# Patient Record
Sex: FEMALE | HISPANIC OR LATINO | Employment: UNEMPLOYED | URBAN - METROPOLITAN AREA
[De-identification: names, ages, dates, MRNs, and addresses within clinical notes are randomized per-mention and may not be internally consistent; named-entity substitution may affect disease eponyms.]

---

## 2023-01-19 ENCOUNTER — OFFICE VISIT (OUTPATIENT)
Dept: OBGYN CLINIC | Facility: CLINIC | Age: 28
End: 2023-01-19

## 2023-01-19 VITALS
HEIGHT: 63 IN | BODY MASS INDEX: 24.8 KG/M2 | HEART RATE: 83 BPM | SYSTOLIC BLOOD PRESSURE: 111 MMHG | DIASTOLIC BLOOD PRESSURE: 78 MMHG | WEIGHT: 140 LBS

## 2023-01-19 DIAGNOSIS — R10.2 PELVIC PAIN: Primary | ICD-10-CM

## 2023-01-19 RX ORDER — DIPHENOXYLATE HYDROCHLORIDE AND ATROPINE SULFATE 2.5; .025 MG/1; MG/1
1 TABLET ORAL DAILY
COMMUNITY

## 2023-01-19 RX ORDER — NORETHINDRONE ACETATE AND ETHINYL ESTRADIOL 1MG-20(21)
1 KIT ORAL DAILY
Qty: 28 TABLET | Refills: 6 | Status: SHIPPED | OUTPATIENT
Start: 2023-01-19 | End: 2023-02-16

## 2023-01-19 NOTE — PROGRESS NOTES
Subjective    Patient is a 31 yo  who presents today to discuss pelvic pain  The patient and her partner are both deaf, but read lips in Uzbek  No certified Uzbek  was available  Anne Marie Birmingham assisted in [place] translation via lip reading  Patient reports that she has had this pain for the past 3-4 months  She had been taking OCPs which helped her pain in [facility], but has since stopped due to a change of rules in her country  She would like to restart the pills  She reports that she has very painful periods, which she has always had  She has no pain with defecation or dysuria  She has no other complaints  OB History        1    Para   0    Term                AB   1    Living   0       SAB   1    IAB        Ectopic        Multiple        Live Births   0                        Objective    Vitals:    23 1134   BP: 111/78   BP Location: Left arm   Patient Position: Sitting   Cuff Size: Adult   Pulse: 83   Weight: 63 5 kg (140 lb)   Height: 5' 3" (1 6 m)        Physical Exam  Constitutional:       Appearance: Normal appearance  Genitourinary:      Vulva normal       Genitourinary Comments: Cervix appears normal  Uterus is normal in contour  No adnexal masses palpated      No vaginal discharge  HENT:      Head: Normocephalic and atraumatic  Cardiovascular:      Rate and Rhythm: Normal rate  Pulmonary:      Effort: Pulmonary effort is normal  No respiratory distress  Abdominal:      Palpations: Abdomen is soft  Tenderness: There is no abdominal tenderness  Musculoskeletal:         General: Normal range of motion  Neurological:      Mental Status: She is alert  Skin:     General: Skin is warm and dry            Assessment    Patient Active Problem List   Diagnosis   • Pelvic pain        Plan  - Pelvic US ordered  - Junel prescribed   - Return for follow up in 2 weeks

## 2023-01-26 ENCOUNTER — HOSPITAL ENCOUNTER (OUTPATIENT)
Dept: ULTRASOUND IMAGING | Facility: HOSPITAL | Age: 28
Discharge: HOME | End: 2023-01-26

## 2023-01-26 DIAGNOSIS — R10.2 PELVIC PAIN: ICD-10-CM

## 2023-08-16 NOTE — PROGRESS NOTES
OB/GYN VISIT  [facility]  2023  12:59 PM      Assessment/Plan:    Whit Guerrero is a 29 y.o.  female with irregular menstrual cycles and bacterial vaginosis. Problem List Items Addressed This Visit        Genitourinary    Bacterial vaginosis    Relevant Medications    metroNIDAZOLE (FLAGYL) 500 mg tablet       Other    Irregular periods/menstrual cycles - Primary     Restart Loestrin  Reviewed normal side effects which are transient including breast pain, bloating, mood changes. Counseled that weight gain not associated. Normal pelvic ultrasound - reviewed results and imaging with patient and partner         Relevant Medications    norethindrone-ethinyl estradiol (Loestrin Fe ) 1-20 MG-MCG per tablet     RTC 3 months for OCP f/u    Subjective:     Whit Guerrero is a 29 y.o.  female who presents for evaluation of irregular periods. Patient presents with her . Both her and her  are deaf and speak Samoan sign language. Unfortunately, Samoan sign language not available on [employer]. Translation of visit completed with assistance of [employer] translate. Patient reports she has had irregular cycles for most of the year. She had two cycles in May (early and late), skipped cycles in /July and then had a normal period in August. She was previously prescribed Junel in January but discontinued due to concern regarding weight gain. She did report regular periods while taking Loestrin. Patient denies dysmenorrhea, dyspareunia, intermenstrual bleeding. She also reports increased vaginal odor and discharge. Had pelvic ultrasound completed in January. Patient does not desire pregnancy at this time, but considering starting trying next year. Objective:    Vitals: Blood pressure 91/88, pulse 90, resp. rate 18, height 5' 3.39" (1.61 m), weight 67 kg (147 lb 11.3 oz), last menstrual period 2023. Body mass index is 25.85 kg/m². History reviewed. No pertinent past medical history. History reviewed. No pertinent surgical history. Physical Exam  Vitals reviewed. Constitutional:       General: She is not in acute distress. Appearance: Normal appearance. She is well-developed and normal weight. She is not ill-appearing, toxic-appearing or diaphoretic. HENT:      Head: Normocephalic and atraumatic. Eyes:      General: No scleral icterus. Conjunctiva/sclera: Conjunctivae normal.   Cardiovascular:      Rate and Rhythm: Normal rate. Pulmonary:      Effort: Pulmonary effort is normal. No respiratory distress. Abdominal:      Palpations: Abdomen is soft. Tenderness: There is no abdominal tenderness. Genitourinary:     General: Normal vulva. Vagina: Vaginal discharge present. Comments: Grossly normal appearing cervix and vagina without lesions, masses, erythema or bleeding. Thin grey/watery discharge in posterior vaginal vault. Microscopy +clue cells, acidic pH. Negative for pseuodohyphae, trichomonas  Skin:     General: Skin is warm and dry. Neurological:      General: No focal deficit present. Mental Status: She is alert and oriented to person, place, and time. Psychiatric:         Mood and Affect: Mood normal.         Behavior: Behavior normal.           PELVIC ULTRASOUND, COMPLETE - January 2023     INDICATION:  The patient is 32years old. R10.2: Pelvic and perineal pain.     COMPARISON: None     TECHNIQUE:   Transabdominal pelvic ultrasound was performed in sagittal and transverse planes with a curvilinear transducer. Additional transvaginal imaging was performed to better evaluate the endometrium and ovaries. Imaging included volumetric   sweeps as well as traditional still imaging technique.     FINDINGS:     UTERUS:  The uterus is anteverted in position, measuring 9.0 x 3.6 x 5.2 cm. The uterus has a normal contour and echotexture.   Small nabothian cysts in the cervix.     ENDOMETRIUM:    The endometrial echo complex has an AP caliber of 10.0 mm. Its appearance is within normal limits for age and cycle and shows no filling defects. .     OVARIES/ADNEXA:  Right ovary:  3.8 x 2.5 x 3.3 cm. 16.4 mL. Left ovary:  4.1 x 2.0 x 2.3 cm. 9.4 mL. Ovarian Doppler flow is within normal limits.   No suspicious ovarian or adnexal abnormality.     OTHER:  No free fluid or loculated fluid collections.        IMPRESSION:     Normal.    Jovita Ignacio MD  8/17/2023  12:59 PM

## 2023-08-17 ENCOUNTER — OFFICE VISIT (OUTPATIENT)
Dept: OBGYN CLINIC | Facility: CLINIC | Age: 28
End: 2023-08-17

## 2023-08-17 VITALS
SYSTOLIC BLOOD PRESSURE: 91 MMHG | WEIGHT: 147.71 LBS | HEIGHT: 63 IN | DIASTOLIC BLOOD PRESSURE: 88 MMHG | BODY MASS INDEX: 26.17 KG/M2 | HEART RATE: 90 BPM | RESPIRATION RATE: 18 BRPM

## 2023-08-17 DIAGNOSIS — N76.0 BACTERIAL VAGINOSIS: ICD-10-CM

## 2023-08-17 DIAGNOSIS — N92.6 IRREGULAR PERIODS/MENSTRUAL CYCLES: Primary | ICD-10-CM

## 2023-08-17 DIAGNOSIS — B96.89 BACTERIAL VAGINOSIS: ICD-10-CM

## 2023-08-17 PROCEDURE — 99213 OFFICE O/P EST LOW 20 MIN: CPT | Performed by: OBSTETRICS & GYNECOLOGY

## 2023-08-17 RX ORDER — NORETHINDRONE ACETATE AND ETHINYL ESTRADIOL 1MG-20(21)
1 KIT ORAL DAILY
Qty: 28 TABLET | Refills: 6 | Status: SHIPPED | OUTPATIENT
Start: 2023-08-17 | End: 2024-08-17

## 2023-08-17 RX ORDER — METRONIDAZOLE 500 MG/1
500 TABLET ORAL EVERY 12 HOURS SCHEDULED
Qty: 14 TABLET | Refills: 0 | Status: SHIPPED | OUTPATIENT
Start: 2023-08-17 | End: 2023-08-24

## 2023-08-17 NOTE — ASSESSMENT & PLAN NOTE
Restart Loestrin  Reviewed normal side effects which are transient including breast pain, bloating, mood changes. Counseled that weight gain not associated.   Normal pelvic ultrasound - reviewed results and imaging with patient and partner

## 2023-08-28 ENCOUNTER — ANNUAL EXAM (OUTPATIENT)
Dept: OBGYN CLINIC | Facility: CLINIC | Age: 28
End: 2023-08-28

## 2023-08-28 VITALS
WEIGHT: 149.4 LBS | HEART RATE: 90 BPM | SYSTOLIC BLOOD PRESSURE: 108 MMHG | DIASTOLIC BLOOD PRESSURE: 76 MMHG | BODY MASS INDEX: 26.47 KG/M2 | HEIGHT: 63 IN

## 2023-08-28 DIAGNOSIS — Z30.41 ENCOUNTER FOR SURVEILLANCE OF CONTRACEPTIVE PILLS: ICD-10-CM

## 2023-08-28 DIAGNOSIS — Z01.419 ENCOUNTER FOR GYNECOLOGICAL EXAMINATION WITHOUT ABNORMAL FINDING: Primary | ICD-10-CM

## 2023-08-28 DIAGNOSIS — Z72.51 HIGH RISK SEXUAL BEHAVIOR, UNSPECIFIED TYPE: ICD-10-CM

## 2023-08-28 LAB — SL AMB POCT URINE HCG: NEGATIVE

## 2023-08-28 PROCEDURE — 87491 CHLMYD TRACH DNA AMP PROBE: CPT | Performed by: NURSE PRACTITIONER

## 2023-08-28 PROCEDURE — 87591 N.GONORRHOEAE DNA AMP PROB: CPT | Performed by: NURSE PRACTITIONER

## 2023-08-28 PROCEDURE — 81025 URINE PREGNANCY TEST: CPT | Performed by: NURSE PRACTITIONER

## 2023-08-28 PROCEDURE — 99395 PREV VISIT EST AGE 18-39: CPT | Performed by: NURSE PRACTITIONER

## 2023-08-28 PROCEDURE — G0145 SCR C/V CYTO,THINLAYER,RESCR: HCPCS | Performed by: NURSE PRACTITIONER

## 2023-08-28 NOTE — PROGRESS NOTES
ASSESSMENT & PLAN: Rafiq Arita is a 29 y.o. Fritzi Ahr with normal gynecologic exam.    1.  Routine well woman exam done today  2. Pap and HPV:  The patient's last pap was  8/2022, was done in [facility]  It was normal.    Pap was done today. Current ASCCP Guidelines reviewed. 3.  The following were reviewed in today's visit: breast self exam, use and side effects of OCPs, adequate intake of calcium and vitamin D, exercise and healthy diet. 4. Gardisil vaccine in women up to age 39 discussed and information was provided. 5. RTO in 3 mos for a pill check. Has a scheduled appt 11/16/2023. Depression Screening Follow-up Plan: Patient's depression screening was positive with a PHQ-2 score of 0. Their PHQ-9 score was 5 . Clinically patient does not have depression. No treatment is required. BMI Counseling: Body mass index is 26.47 kg/m². The BMI is above normal. Nutrition recommendations include 3-5 servings of fruits/vegetables daily, moderation in carbohydrate intake and reducing intake of cholesterol. Exercise recommendations include exercising 3-5 times per week. CC:  Annual Gynecologic Examination    HPI: Rafiq Arita is a 29 y.o. Fritzi Ahr who presents for annual gynecologic examination. She and her  are both deaf and speaks Italian sign language. This is not available on [employer], translation will need to be done through assistance of [employer]. She was seen 8/17/2023 for irregular menses and was also treated for BV. She was started on Loestrin to help regulate her periods. She has had irregular cycles for most of the year she had 2 cycles in May, skip cycles in June and July and then had a normal period in August.  She previously was prescribed Junel but discontinued due to weight gain. She did report regular periods with Loestrin in the past.    Health Maintenance:    She wears her seatbelt routinely.     She does perform regular monthly self breast exams.    She feels safe at home. History reviewed. No pertinent past medical history. History reviewed. No pertinent surgical history. Past OB/Gyn History:  OB History        1    Para   0    Term                AB   1    Living   0       SAB   1    IAB        Ectopic        Multiple        Live Births   0               Pt does not have menstrual issues. Regular when she takes OCP's  History of sexually transmitted infection: No.  History of abnormal pap smears: No .    Patient is currently sexually active. heterosexual.  The current method of family planning is OCP (estrogen/progesterone).     Family History   Problem Relation Age of Onset   • No Known Problems Mother    • No Known Problems Father    • No Known Problems Brother    • No Known Problems Brother    • No Known Problems Maternal Grandmother    • Heart disease Maternal Grandfather    • No Known Problems Paternal Grandmother    • Heart disease Paternal Grandfather    • Breast cancer Neg Hx    • Colon cancer Neg Hx    • Ovarian cancer Neg Hx        Social History:  Social History     Socioeconomic History   • Marital status: /Civil Union     Spouse name: Not on file   • Number of children: Not on file   • Years of education: Not on file   • Highest education level: Not on file   Occupational History   • Not on file   Tobacco Use   • Smoking status: Never   • Smokeless tobacco: Never   Vaping Use   • Vaping Use: Never used   Substance and Sexual Activity   • Alcohol use: Never   • Drug use: Never   • Sexual activity: Yes     Partners: Male     Birth control/protection: OCP   Other Topics Concern   • Not on file   Social History Narrative   • Not on file     Social Determinants of Health     Financial Resource Strain: Low Risk  (2023)    Overall Financial Resource Strain (CARDIA)    • Difficulty of Paying Living Expenses: Not hard at all   Food Insecurity: No Food Insecurity (2023)    Hunger Vital Sign    • Worried About Running Out of Food in the Last Year: Never true    • Ran Out of Food in the Last Year: Never true   Transportation Needs: No Transportation Needs (8/17/2023)    PRAPARE - Transportation    • Lack of Transportation (Medical): No    • Lack of Transportation (Non-Medical): No   Physical Activity: Not on file   Stress: Not on file   Social Connections: Not on file   Intimate Partner Violence: Not At Risk (8/28/2023)    Humiliation, Afraid, Rape, and Kick questionnaire    • Fear of Current or Ex-Partner: No    • Emotionally Abused: No    • Physically Abused: No    • Sexually Abused: No   Housing Stability: Low Risk  (8/17/2023)    Housing Stability Vital Sign    • Unable to Pay for Housing in the Last Year: No    • Number of Places Lived in the Last Year: 1    • Unstable Housing in the Last Year: No     Presently lives with . Patient is . Patient is currently unemployed     No Known Allergies      Current Outpatient Medications:   •  norethindrone-ethinyl estradiol (Loestrin Fe 1/20) 1-20 MG-MCG per tablet, Take 1 tablet by mouth daily, Disp: 28 tablet, Rfl: 6  •  multivitamin (THERAGRAN) TABS, Take 1 tablet by mouth daily (Patient not taking: Reported on 8/17/2023), Disp: , Rfl:       Review of Systems  Constitutional :no fever, feels well, no tiredness, no recent weight gain or loss  ENT: no ear ache, no loss of hearing, no nosebleeds or nasal discharge, no sore throat or hoarseness. Cardiovascular: no complaints of slow or fast heart beat, no chest pain, no palpitations, no leg claudication or lower extremity edema.   Respiratory: no complaints of shortness of shortness of breath, no MARIA  Breasts:no complaints of breast pain, breast lump, or nipple discharge  Gastrointestinal: no complaints of abdominal pain, constipation, nausea, vomiting, or diarrhea or bloody stools  Genitourinary : no complaints of dysuria, incontinence, pelvic pain, no dysmenorrhea, vaginal discharge or abnormal vaginal bleeding and as noted in HPI. Musculoskeletal: no complaints of arthralgia, no myalgia, no joint swelling or stiffness, no limb pain or swelling. Integumentary: no complaints of skin rash or lesion, itching or dry skin  Neurological: no complaints of headache, no confusion, no numbness or tingling, no dizziness or fainting    Objective      /76   Pulse 90   Ht 5' 2.99" (1.6 m)   Wt 67.8 kg (149 lb 6.4 oz)   LMP 08/08/2023 (Exact Date)   BMI 26.47 kg/m²   General:   appears stated age, cooperative, alert normal mood and affect   Neck: normal, supple,trachea midline, no masses   Heart: regular rate and rhythm, S1, S2 normal, no murmur, click, rub or gallop   Lungs: clear to auscultation bilaterally   Breasts: normal appearance, no masses or tenderness, Inspection negative, No nipple retraction or dimpling, No nipple discharge or bleeding, No axillary or supraclavicular adenopathy, Normal to palpation without dominant masses, Taught monthly breast self examination   Abdomen: soft, non-tender, without masses or organomegaly   Vulva: normal female genitalia, Bartholin's, Urethra, [employer] normal   Vagina: normal vagina, no discharge, exudate, lesion, or erythema   Urethra: normal   Cervix: Normal, no discharge. PAP done. GCC done. Nontender. Uterus: normal size, contour, position, consistency, mobility, non-tender and anteverted   Adnexa: normal adnexa and no mass, fullness, tenderness   Lymphatic palpation of lymph nodes in neck, axilla, groin and/or other locations: no lymphadenopathy or masses noted   Skin normal skin turgor and no rashes.    Psychiatric orientation to person, place, and time: normal. mood and affect: normal

## 2023-08-29 LAB
C TRACH DNA SPEC QL NAA+PROBE: NEGATIVE
N GONORRHOEA DNA SPEC QL NAA+PROBE: NEGATIVE

## 2023-08-30 ENCOUNTER — TELEPHONE (OUTPATIENT)
Dept: OBGYN CLINIC | Facility: CLINIC | Age: 28
End: 2023-08-30

## 2023-08-30 NOTE — TELEPHONE ENCOUNTER
----- Message from Manolo Diez, [place] sent at 8/29/2023 12:24 PM EDT -----  Please inform pt that her culture for chlamydia and gonorrhea were negative. Thank You!

## 2023-09-01 LAB
LAB AP GYN PRIMARY INTERPRETATION: NORMAL
Lab: NORMAL

## 2023-09-05 ENCOUNTER — TELEPHONE (OUTPATIENT)
Dept: OBGYN CLINIC | Facility: CLINIC | Age: 28
End: 2023-09-05

## 2023-09-05 NOTE — TELEPHONE ENCOUNTER
----- Message from Vineet Mcdonnell [place] sent at 9/1/2023 10:56 AM EDT -----  Please inform pt that her pap was negative. Thank You.

## 2023-09-05 NOTE — TELEPHONE ENCOUNTER
Message left for patient that results are available in [facility]. Encouraged to call office with any questions/concerns.

## 2023-10-05 DIAGNOSIS — N92.6 IRREGULAR PERIODS/MENSTRUAL CYCLES: ICD-10-CM

## 2023-10-11 RX ORDER — NORETHINDRONE ACETATE AND ETHINYL ESTRADIOL AND FERROUS FUMARATE 1MG-20(21)
1 KIT ORAL DAILY
Qty: 84 TABLET | Refills: 3 | Status: SHIPPED | OUTPATIENT
Start: 2023-10-11

## 2023-10-27 PROBLEM — Z01.419 ENCOUNTER FOR GYNECOLOGICAL EXAMINATION WITHOUT ABNORMAL FINDING: Status: RESOLVED | Noted: 2023-08-28 | Resolved: 2023-10-27

## 2023-11-16 ENCOUNTER — OFFICE VISIT (OUTPATIENT)
Dept: OBGYN CLINIC | Facility: CLINIC | Age: 28
End: 2023-11-16

## 2023-11-16 VITALS
DIASTOLIC BLOOD PRESSURE: 83 MMHG | SYSTOLIC BLOOD PRESSURE: 119 MMHG | WEIGHT: 150.6 LBS | BODY MASS INDEX: 26.68 KG/M2 | HEIGHT: 63 IN | HEART RATE: 97 BPM

## 2023-11-16 DIAGNOSIS — Z31.9 PATIENT DESIRES PREGNANCY: Primary | ICD-10-CM

## 2023-11-16 PROBLEM — N76.0 BACTERIAL VAGINOSIS: Status: RESOLVED | Noted: 2023-08-17 | Resolved: 2023-11-16

## 2023-11-16 PROBLEM — Z30.41 ENCOUNTER FOR SURVEILLANCE OF CONTRACEPTIVE PILLS: Status: RESOLVED | Noted: 2023-08-28 | Resolved: 2023-11-16

## 2023-11-16 PROBLEM — B96.89 BACTERIAL VAGINOSIS: Status: RESOLVED | Noted: 2023-08-17 | Resolved: 2023-11-16

## 2023-11-16 RX ORDER — PNV NO.95/FERROUS FUM/FOLIC AC 28MG-0.8MG
1 TABLET ORAL DAILY
Qty: 90 TABLET | Refills: 3 | Status: SHIPPED | OUTPATIENT
Start: 2023-11-16 | End: 2024-11-14

## 2023-11-16 NOTE — PROGRESS NOTES
Assessment/Plan:    No problem-specific Assessment & Plan notes found for this encounter. Annual due 8/28/2024     Diagnoses and all orders for this visit:    Patient desires pregnancy  -     Prenatal Vit-Fe Fumarate-FA (Prenatal Vitamin) 27-0.8 MG TABS; Take 1 tablet by mouth in the morning  Reviewed to RTO in 6 months if pregnancy not achieved. Reviewed timed intercourse. Annual due 8/2024        Subjective:      Patient ID: Jah Ortiz is a 29 y.o. female. HPI 28 yo here for a pill check. She is on Loestrin Fe 1/20 but she has just stopped use. Pt is deaf,  sign  [id number] used. Pt desires to have a pregnancy but not sure when she wants to start. Will order prenatal vitamins. While on the pill her menses were regular, monthly and last about 6 days. Previously they were skipping sometimes for a  month. Reviewed they may return to their irregular schedule. She reports previously they were regular. She denies pain with her periods. She had a normal pelvic US 1/2023. She denies any medical problems, reports her brother and her were both deaf at birth due to an infection that her mother had in pregnancy. She does not use alcohol, smoke or drug use. Her BP is normal  today. The following portions of the patient's history were reviewed and updated as appropriate: allergies, current medications, past family history, past medical history, past social history, past surgical history, and problem list.    Review of Systems   Constitutional:  Negative for chills and fever. Eyes:  Negative for photophobia and visual disturbance. Respiratory: Negative. Cardiovascular: Negative. Gastrointestinal: Negative. Genitourinary:  Negative for menstrual problem. Neurological:  Negative for headaches.          Objective:      /83   Pulse 97   Ht 5' 3" (1.6 m)   Wt 68.3 kg (150 lb 9.6 oz)   LMP 11/06/2023   BMI 26.68 kg/m²          Physical Exam  Constitutional: Appearance: Normal appearance. Cardiovascular:      Rate and Rhythm: Normal rate.    Pulmonary:      Effort: Pulmonary effort is normal.

## 2024-07-10 ENCOUNTER — TELEPHONE (OUTPATIENT)
Age: 29
End: 2024-07-10

## 2024-07-10 NOTE — TELEPHONE ENCOUNTER
Pt called in regarding an upcoming appt with our office on 7/17. Pt states she is deaf and will need a  for this visit.    Please advise.

## 2024-07-17 ENCOUNTER — OFFICE VISIT (OUTPATIENT)
Dept: FAMILY MEDICINE CLINIC | Facility: CLINIC | Age: 29
End: 2024-07-17
Payer: COMMERCIAL

## 2024-07-17 VITALS
WEIGHT: 161.8 LBS | DIASTOLIC BLOOD PRESSURE: 64 MMHG | TEMPERATURE: 97.4 F | RESPIRATION RATE: 14 BRPM | SYSTOLIC BLOOD PRESSURE: 116 MMHG | OXYGEN SATURATION: 98 % | BODY MASS INDEX: 27.62 KG/M2 | HEART RATE: 88 BPM | HEIGHT: 64 IN

## 2024-07-17 DIAGNOSIS — Z11.4 SCREENING FOR HIV (HUMAN IMMUNODEFICIENCY VIRUS): ICD-10-CM

## 2024-07-17 DIAGNOSIS — Z11.59 NEED FOR HEPATITIS C SCREENING TEST: ICD-10-CM

## 2024-07-17 DIAGNOSIS — M54.2 NECK PAIN: ICD-10-CM

## 2024-07-17 DIAGNOSIS — M62.838 TRAPEZIUS MUSCLE SPASM: Primary | ICD-10-CM

## 2024-07-17 DIAGNOSIS — Z11.3 SCREENING EXAMINATION FOR STD (SEXUALLY TRANSMITTED DISEASE): ICD-10-CM

## 2024-07-17 DIAGNOSIS — G89.29 CHRONIC BILATERAL THORACIC BACK PAIN: ICD-10-CM

## 2024-07-17 DIAGNOSIS — M54.6 CHRONIC BILATERAL THORACIC BACK PAIN: ICD-10-CM

## 2024-07-17 DIAGNOSIS — L84 CORN OF FOOT: ICD-10-CM

## 2024-07-17 DIAGNOSIS — Z00.01 ENCOUNTER FOR GENERAL ADULT MEDICAL EXAMINATION WITH ABNORMAL FINDINGS: ICD-10-CM

## 2024-07-17 PROBLEM — Z72.51 HIGH RISK SEXUAL BEHAVIOR: Status: RESOLVED | Noted: 2023-08-28 | Resolved: 2024-07-17

## 2024-07-17 PROBLEM — Z31.9 PATIENT DESIRES PREGNANCY: Status: RESOLVED | Noted: 2023-11-16 | Resolved: 2024-07-17

## 2024-07-17 PROCEDURE — 99204 OFFICE O/P NEW MOD 45 MIN: CPT | Performed by: FAMILY MEDICINE

## 2024-07-17 PROCEDURE — 99395 PREV VISIT EST AGE 18-39: CPT | Performed by: FAMILY MEDICINE

## 2024-07-17 RX ORDER — MELOXICAM 15 MG/1
15 TABLET ORAL DAILY
Qty: 30 TABLET | Refills: 2 | Status: SHIPPED | OUTPATIENT
Start: 2024-07-17

## 2024-07-17 RX ORDER — TIZANIDINE 2 MG/1
2 TABLET ORAL
Qty: 30 TABLET | Refills: 0 | Status: SHIPPED | OUTPATIENT
Start: 2024-07-17

## 2024-07-17 NOTE — PROGRESS NOTES
Adult Annual Physical  Name: Bee Marquez      : 1995      MRN: [id number]  Encounter Provider: Melissa Coleman MD  Encounter Date: 2024   Encounter department: [facility] MEDICINE    Assessment & Plan   1. Trapezius muscle spasm  -     XR spine cervical complete 4 or 5 vw non injury; Future; Expected date: 2024  -     tiZANidine (ZANAFLEX) 2 mg tablet; Take 1 tablet (2 mg total) by mouth daily at bedtime as needed for muscle spasms  -     XR spine thoracic 3 vw; Future; Expected date: 2024  -     Vitamin D 25 hydroxy; Future  2. [place] of foot  -     Ambulatory Referral to Podiatry; Future  3. Neck pain  -     meloxicam (Mobic) 15 mg tablet; Take 1 tablet (15 mg total) by mouth daily  -     Vitamin D 25 hydroxy; Future  4. Chronic bilateral thoracic back pain  -     HLA-B27 antigen; Future  -     TONA Screen w/ Reflex to Titer/Pattern; Future  -     Sedimentation rate, automated; Future  -     meloxicam (Mobic) 15 mg tablet; Take 1 tablet (15 mg total) by mouth daily  5. Encounter for general adult medical examination with abnormal findings  -     CBC and differential; Future  -     Comprehensive metabolic panel; Future  -     Lipid panel; Future  -     TSH, 3rd generation with Free T4 reflex; Future  -     Hemoglobin A1C; Future  6. Need for hepatitis C screening test  -     Hepatitis C Antibody; Future  7. Screening for HIV (human immunodeficiency virus)  -     HIV 1/2 AG/AB w Reflex [facility] for 2 yr old and above; Future  8. Screening examination for STD (sexually transmitted disease)  -     Hepatitis C Antibody; Future  -     HIV 1/2 AG/AB w Reflex [facility] for 2 yr old and above; Future  Recommend x-ray of the cervical spine and the thoracic spine.  Recommend meloxicam 15 mg daily in the morning after food, tizanidine 2 mg at bedtime.  Check labs as above.      Advised to use over-the-counter corn tape along with afshin stone for corn relief and follow-up with  podiatry if not improved.      Immunizations and preventive care screenings were discussed with patient today. Appropriate education was printed on patient's after visit summary.    Counseling:  Alcohol/drug use: discussed moderation in alcohol intake, the recommendations for healthy alcohol use, and avoidance of illicit drug use.  Dental Health: discussed importance of regular tooth brushing, flossing, and dental visits.  Injury prevention: discussed safety/seat belts, safety helmets, smoke detectors, carbon dioxide detectors, and smoking near bedding or upholstery.  Sexual health: discussed sexually transmitted diseases, partner selection, use of condoms, avoidance of unintended pregnancy, and contraceptive alternatives.  Exercise: the importance of regular exercise/physical activity was discussed. Recommend exercise 3-5 times per week for at least 30 minutes.          History of Present Illness     Adult Annual Physical:  Patient presents for annual physical. 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  She moved here from [place] about a year ago.    She does report that the pain is mainly in the morning or in the evening.  Live  present in the room during the visit.  Patient is also due for annual physical.    Patient was scheduled for annual physical however patient did have EM problems that were addressed and is aware that they will get 2 separate bills, and is agreeable to conducting the visit.  .     Diet and Physical Activity:  - Diet/Nutrition: well balanced diet and consuming 3-5 servings of fruits/vegetables daily.    Depression Screening:  - PHQ-2 Score: 0    General Health:  - Sleep: sleeps well.  - Hearing:. 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    /GYN Health:  - Follows with GYN: yes.   - Menopause: premenopausal.     Review of Systems   Constitutional:   "Negative for fatigue and fever.   HENT:  Negative for congestion, facial swelling, mouth sores, rhinorrhea, sore throat and trouble swallowing.    Eyes:  Negative for pain and redness.   Respiratory:  Negative for cough, shortness of breath and wheezing.    Cardiovascular:  Negative for chest pain, palpitations and leg swelling.   Gastrointestinal:  Negative for abdominal pain, blood in stool, constipation, diarrhea and nausea.   Genitourinary:  Negative for dysuria, hematuria and urgency.   Musculoskeletal:  Positive for myalgias and neck pain. Negative for arthralgias and back pain.   Skin:  Negative for rash and wound.   Neurological:  Negative for seizures, syncope and headaches.   Hematological:  Negative for adenopathy.   Psychiatric/Behavioral:  Negative for agitation and behavioral problems.      Medical History Reviewed by provider this encounter:  Tobacco  Allergies  Meds  Problems  Med Hx  Surg Hx  Fam Hx       Current Outpatient Medications on File Prior to Visit   Medication Sig Dispense Refill    Prenatal Vit-Fe Fumarate-FA (Prenatal Vitamin) 27-0.8 MG TABS Take 1 tablet by mouth in the morning 90 tablet 3    multivitamin (THERAGRAN) TABS Take 1 tablet by mouth daily (Patient not taking: Reported on 8/17/2023)       No current facility-administered medications on file prior to visit.      Social History     Tobacco Use    Smoking status: Never    Smokeless tobacco: Never   Vaping Use    Vaping status: Never Used   Substance and Sexual Activity    Alcohol use: Never    Drug use: Never    Sexual activity: Yes     Partners: Male       Objective     /64 (BP Location: Left arm, Patient Position: Sitting, Cuff Size: Adult)   Pulse 88   Temp (!) 97.4 °F (36.3 °C) (Tympanic)   Resp 14   Ht 5' 4\" (1.626 m)   Wt 73.4 kg (161 lb 12.8 oz)   SpO2 98%   BMI 27.77 kg/m²     Physical Exam  Vitals and nursing note reviewed.   Constitutional:       Appearance: She is well-developed.   HENT:      " Head: Normocephalic and atraumatic.      Right Ear: Tympanic membrane, ear canal and external ear normal.      Left Ear: Tympanic membrane, ear canal and external ear normal.      Nose: Nose normal.      Mouth/Throat:      Pharynx: No oropharyngeal exudate.   Eyes:      General: No scleral icterus.        Right eye: No discharge.         Left eye: No discharge.      Conjunctiva/sclera: Conjunctivae normal.      Pupils: Pupils are equal, round, and reactive to light.   Neck:      Thyroid: No thyromegaly.   Cardiovascular:      Rate and Rhythm: Normal rate and regular rhythm.      Heart sounds: No murmur heard.     No gallop.   Pulmonary:      Effort: Pulmonary effort is normal. No respiratory distress.      Breath sounds: Normal breath sounds. No wheezing or rales.   Abdominal:      Palpations: Abdomen is soft.      Tenderness: There is no abdominal tenderness.   Musculoskeletal:         General: Tenderness (bilateral trapezius) present. No deformity.      Cervical back: Normal range of motion.      Right lower leg: No edema.      Left lower leg: No edema.   Lymphadenopathy:      Cervical: No cervical adenopathy.   Skin:     General: Skin is warm.      Capillary Refill: Capillary refill takes less than 2 seconds.      Findings: Lesion (corn right foot) present. No erythema or rash.   Neurological:      Mental Status: She is alert and oriented to person, place, and time.      Deep Tendon Reflexes: Reflexes normal.   Psychiatric:         Behavior: Behavior normal.         Thought Content: Thought content normal.         Judgment: Judgment normal.

## 2024-07-18 ENCOUNTER — APPOINTMENT (OUTPATIENT)
Dept: LAB | Facility: HOSPITAL | Age: 29
End: 2024-07-18
Payer: COMMERCIAL

## 2024-07-18 DIAGNOSIS — Z00.01 ENCOUNTER FOR GENERAL ADULT MEDICAL EXAMINATION WITH ABNORMAL FINDINGS: ICD-10-CM

## 2024-07-18 DIAGNOSIS — M54.6 CHRONIC BILATERAL THORACIC BACK PAIN: ICD-10-CM

## 2024-07-18 DIAGNOSIS — Z11.4 SCREENING FOR HIV (HUMAN IMMUNODEFICIENCY VIRUS): ICD-10-CM

## 2024-07-18 DIAGNOSIS — M62.838 TRAPEZIUS MUSCLE SPASM: ICD-10-CM

## 2024-07-18 DIAGNOSIS — M54.2 NECK PAIN: ICD-10-CM

## 2024-07-18 DIAGNOSIS — G89.29 CHRONIC BILATERAL THORACIC BACK PAIN: ICD-10-CM

## 2024-07-18 DIAGNOSIS — Z11.3 SCREENING EXAMINATION FOR STD (SEXUALLY TRANSMITTED DISEASE): ICD-10-CM

## 2024-07-18 DIAGNOSIS — Z11.59 NEED FOR HEPATITIS C SCREENING TEST: ICD-10-CM

## 2024-07-18 LAB
25(OH)D3 SERPL-MCNC: 24.8 NG/ML (ref 30–100)
ALBUMIN SERPL BCG-MCNC: 4.1 G/DL (ref 3.5–5)
ALP SERPL-CCNC: 40 U/L (ref 34–104)
ALT SERPL W P-5'-P-CCNC: 30 U/L (ref 7–52)
ANA SER QL IA: NEGATIVE
ANION GAP SERPL CALCULATED.3IONS-SCNC: 7 MMOL/L (ref 4–13)
AST SERPL W P-5'-P-CCNC: 22 U/L (ref 13–39)
BASOPHILS # BLD AUTO: 0.05 THOUSANDS/ÂΜL (ref 0–0.1)
BASOPHILS NFR BLD AUTO: 1 % (ref 0–1)
BILIRUB SERPL-MCNC: 0.5 MG/DL (ref 0.2–1)
BUN SERPL-MCNC: 13 MG/DL (ref 5–25)
CALCIUM SERPL-MCNC: 8.9 MG/DL (ref 8.4–10.2)
CHLORIDE SERPL-SCNC: 106 MMOL/L (ref 96–108)
CHOLEST SERPL-MCNC: 158 MG/DL
CO2 SERPL-SCNC: 25 MMOL/L (ref 21–32)
CREAT SERPL-MCNC: 0.58 MG/DL (ref 0.6–1.3)
EOSINOPHIL # BLD AUTO: 0.18 THOUSAND/ÂΜL (ref 0–0.61)
EOSINOPHIL NFR BLD AUTO: 3 % (ref 0–6)
ERYTHROCYTE [DISTWIDTH] IN BLOOD BY AUTOMATED COUNT: 12.7 % (ref 11.6–15.1)
ERYTHROCYTE [SEDIMENTATION RATE] IN BLOOD: 8 MM/HOUR (ref 0–19)
EST. AVERAGE GLUCOSE BLD GHB EST-MCNC: 120 MG/DL
GFR SERPL CREATININE-BSD FRML MDRD: 124 ML/MIN/1.73SQ M
GLUCOSE P FAST SERPL-MCNC: 94 MG/DL (ref 65–99)
HBA1C MFR BLD: 5.8 %
HCT VFR BLD AUTO: 39.1 % (ref 34.8–46.1)
HCV AB SER QL: NORMAL
HDLC SERPL-MCNC: 55 MG/DL
HGB BLD-MCNC: 12.8 G/DL (ref 11.5–15.4)
HIV 1+2 AB+HIV1 P24 AG SERPL QL IA: NORMAL
HIV 2 AB SERPL QL IA: NORMAL
HIV1 AB SERPL QL IA: NORMAL
HIV1 P24 AG SERPL QL IA: NORMAL
IMM GRANULOCYTES # BLD AUTO: 0.02 THOUSAND/UL (ref 0–0.2)
IMM GRANULOCYTES NFR BLD AUTO: 0 % (ref 0–2)
LDLC SERPL CALC-MCNC: 91 MG/DL (ref 0–100)
LYMPHOCYTES # BLD AUTO: 2.26 THOUSANDS/ÂΜL (ref 0.6–4.47)
LYMPHOCYTES NFR BLD AUTO: 38 % (ref 14–44)
MCH RBC QN AUTO: 29.1 PG (ref 26.8–34.3)
MCHC RBC AUTO-ENTMCNC: 32.7 G/DL (ref 31.4–37.4)
MCV RBC AUTO: 89 FL (ref 82–98)
MONOCYTES # BLD AUTO: 0.35 THOUSAND/ÂΜL (ref 0.17–1.22)
MONOCYTES NFR BLD AUTO: 6 % (ref 4–12)
NEUTROPHILS # BLD AUTO: 3.03 THOUSANDS/ÂΜL (ref 1.85–7.62)
NEUTS SEG NFR BLD AUTO: 52 % (ref 43–75)
NONHDLC SERPL-MCNC: 103 MG/DL
NRBC BLD AUTO-RTO: 0 /100 WBCS
PLATELET # BLD AUTO: 351 THOUSANDS/UL (ref 149–390)
PMV BLD AUTO: 9.5 FL (ref 8.9–12.7)
POTASSIUM SERPL-SCNC: 4 MMOL/L (ref 3.5–5.3)
PROT SERPL-MCNC: 6.9 G/DL (ref 6.4–8.4)
RBC # BLD AUTO: 4.4 MILLION/UL (ref 3.81–5.12)
SODIUM SERPL-SCNC: 138 MMOL/L (ref 135–147)
TRIGL SERPL-MCNC: 62 MG/DL
TSH SERPL DL<=0.05 MIU/L-ACNC: 1.85 UIU/ML (ref 0.45–4.5)
WBC # BLD AUTO: 5.89 THOUSAND/UL (ref 4.31–10.16)

## 2024-07-18 PROCEDURE — 80053 COMPREHEN METABOLIC PANEL: CPT

## 2024-07-18 PROCEDURE — 87389 HIV-1 AG W/HIV-1&-2 AB AG IA: CPT

## 2024-07-18 PROCEDURE — 86803 HEPATITIS C AB TEST: CPT

## 2024-07-18 PROCEDURE — 83036 HEMOGLOBIN GLYCOSYLATED A1C: CPT

## 2024-07-18 PROCEDURE — 85025 COMPLETE CBC W/AUTO DIFF WBC: CPT

## 2024-07-18 PROCEDURE — 84443 ASSAY THYROID STIM HORMONE: CPT

## 2024-07-18 PROCEDURE — 81374 HLA I TYPING 1 ANTIGEN LR: CPT

## 2024-07-18 PROCEDURE — 80061 LIPID PANEL: CPT

## 2024-07-18 PROCEDURE — 82306 VITAMIN D 25 HYDROXY: CPT

## 2024-07-18 PROCEDURE — 85652 RBC SED RATE AUTOMATED: CPT

## 2024-07-18 PROCEDURE — 86038 ANTINUCLEAR ANTIBODIES: CPT

## 2024-07-18 PROCEDURE — 36415 COLL VENOUS BLD VENIPUNCTURE: CPT

## 2024-07-22 DIAGNOSIS — E55.9 VITAMIN D DEFICIENCY: Primary | ICD-10-CM

## 2024-07-22 RX ORDER — ERGOCALCIFEROL 1.25 MG/1
50000 CAPSULE ORAL WEEKLY
Qty: 12 CAPSULE | Refills: 1 | Status: SHIPPED | OUTPATIENT
Start: 2024-07-22

## 2024-07-26 LAB — HLA-B27 QL NAA+PROBE: NEGATIVE

## 2024-08-09 DIAGNOSIS — M62.838 TRAPEZIUS MUSCLE SPASM: ICD-10-CM

## 2024-08-12 RX ORDER — TIZANIDINE 2 MG/1
2 TABLET ORAL
Qty: 30 TABLET | Refills: 0 | Status: SHIPPED | OUTPATIENT
Start: 2024-08-12

## 2024-08-28 DIAGNOSIS — M62.838 TRAPEZIUS MUSCLE SPASM: ICD-10-CM

## 2024-08-29 RX ORDER — TIZANIDINE 2 MG/1
2 TABLET ORAL
Qty: 30 TABLET | Refills: 1 | Status: SHIPPED | OUTPATIENT
Start: 2024-08-29

## 2024-09-06 ENCOUNTER — OFFICE VISIT (OUTPATIENT)
Dept: PODIATRY | Facility: CLINIC | Age: 29
End: 2024-09-06
Payer: COMMERCIAL

## 2024-09-06 VITALS
OXYGEN SATURATION: 99 % | SYSTOLIC BLOOD PRESSURE: 113 MMHG | HEART RATE: 67 BPM | WEIGHT: 160 LBS | DIASTOLIC BLOOD PRESSURE: 80 MMHG | HEIGHT: 64 IN | BODY MASS INDEX: 27.31 KG/M2

## 2024-09-06 DIAGNOSIS — B07.0 VERRUCA PLANTARIS: ICD-10-CM

## 2024-09-06 PROCEDURE — 99202 OFFICE O/P NEW SF 15 MIN: CPT | Performed by: PODIATRIST

## 2024-09-06 PROCEDURE — 17110 DESTRUCTION B9 LES UP TO 14: CPT | Performed by: PODIATRIST

## 2024-09-06 NOTE — PROGRESS NOTES
Assessment/Plan:     The patient's clinical examination today significant for a punctate callosity to the distal/plantar aspect of the left great toe with tenderness palpation lateral squeeze.  There is interruption of skin lines with small punctate hematomas consistent with verruca formation.  Pedal pulses are palpable.    The verrucous lesion was debrided of callus tissue utilizing sterile #15 blade without complication.  The lesion was then chemically ablated with application of Cantharone.  And dry sterile dressing was applied and is to maintain for the next 48 hours.    Recommend follow-up in 3 to 4 weeks.     Diagnoses and all orders for this visit:    Verruca plantaris  -     Ambulatory Referral to Podiatry          Subjective:     Patient ID: Bee Marquez is a 29 y.o. female.    The patient presents today for her initial consultation with [facility] podiatry with a chief complaint of a painful callus lesion to the plantar aspect of her left great toe that has been present now for couple of months.  She does note that the lesion is tender.  She has not tried any treatments for this at home.  The patient is deaf and a  was utilized via iPad for the duration of this encounter.      PAST MEDICAL HISTORY:  Past Medical History:   Diagnosis Date    Patient desires pregnancy 11/16/2023       PAST SURGICAL HISTORY:  History reviewed. No pertinent surgical history.     ALLERGIES:  Patient has no known allergies.    MEDICATIONS:  Current Outpatient Medications   Medication Sig Dispense Refill    ergocalciferol (VITAMIN D2) 50,000 units Take 1 capsule (50,000 Units total) by mouth once a week 12 capsule 1    Prenatal Vit-Fe Fumarate-FA (Prenatal Vitamin) 27-0.8 MG TABS Take 1 tablet by mouth in the morning 90 tablet 3    meloxicam (Mobic) 15 mg tablet Take 1 tablet (15 mg total) by mouth daily (Patient not taking: Reported on 9/6/2024) 30 tablet 2    multivitamin (THERAGRAN) TABS  Take 1 tablet by mouth daily (Patient not taking: Reported on 8/17/2023)      tiZANidine (ZANAFLEX) 2 mg tablet TAKE 1 TABLET (2 MG TOTAL) BY MOUTH DAILY AT BEDTIME AS NEEDED FOR MUSCLE SPASMS (Patient not taking: Reported on 9/6/2024) 30 tablet 1     No current facility-administered medications for this visit.       SOCIAL HISTORY:  Social History     Socioeconomic History    Marital status: /Civil Union     Spouse name: None    Number of children: None    Years of education: None    Highest education level: None   Occupational History    None   Tobacco Use    Smoking status: Never    Smokeless tobacco: Never   Vaping Use    Vaping status: Never Used   Substance and Sexual Activity    Alcohol use: Never    Drug use: Never    Sexual activity: Yes     Partners: Male   Other Topics Concern    None   Social History Narrative    None     Social Determinants of Health     Financial Resource Strain: Low Risk  (8/17/2023)    Overall Financial Resource Strain (CARDIA)     Difficulty of Paying Living Expenses: Not hard at all   Food Insecurity: No Food Insecurity (8/17/2023)    Hunger Vital Sign     Worried About Running Out of Food in the Last Year: Never true     Ran Out of Food in the Last Year: Never true   Transportation Needs: No Transportation Needs (8/17/2023)    PRAPARE - Transportation     Lack of Transportation (Medical): No     Lack of Transportation (Non-Medical): No   Physical Activity: Not on file   Stress: Not on file   Social Connections: Not on file   Intimate Partner Violence: Not At Risk (8/28/2023)    Humiliation, Afraid, Rape, and Kick questionnaire     Fear of Current or Ex-Partner: No     Emotionally Abused: No     Physically Abused: No     Sexually Abused: No   Housing Stability: Low Risk  (8/17/2023)    Housing Stability Vital Sign     Unable to Pay for Housing in the Last Year: No     Number of Times Moved in the Last Year: 1     Homeless in the Last Year: No        Review of Systems  "  Constitutional: Negative.    Respiratory:  Negative for shortness of breath.    Psychiatric/Behavioral: Negative.           Objective:     Physical Exam  Constitutional:       Appearance: Normal appearance.   HENT:      Head: Normocephalic and atraumatic.      Nose: Nose normal.   Cardiovascular:      Pulses:           Dorsalis pedis pulses are 2+ on the left side.        Posterior tibial pulses are 2+ on the left side.   Pulmonary:      Effort: Pulmonary effort is normal.   Musculoskeletal:        Feet:    Feet:      Left foot:      Skin integrity: Callus present.      Comments: The patient's clinical examination today significant for a punctate callosity to the distal/plantar aspect of the left great toe with tenderness palpation lateral squeeze.  There is interruption of skin lines with small punctate hematomas consistent with verruca formation.  Pedal pulses are palpable.  Skin:     General: Skin is warm.      Capillary Refill: Capillary refill takes less than 2 seconds.   Neurological:      General: No focal deficit present.      Mental Status: She is alert and oriented to person, place, and time.   Psychiatric:         Mood and Affect: Mood normal.         Behavior: Behavior normal.         Thought Content: Thought content normal.         Lesion Destruction    Date/Time: 9/6/2024 10:40 AM    Performed by: Chidi Antonio DPM  Authorized by: Chidi Antonio DPM  [place] Protocol:  procedure performed by consultantConsent: Verbal consent obtained.  Risks and benefits: risks, benefits and alternatives were discussed  Consent given by: patient  Time out: Immediately prior to procedure a \"time out\" was called to verify the correct patient, procedure, equipment, support staff and site/side marked as required.  Timeout called at: 9/6/2024 10:40 AM.  Patient understanding: patient states understanding of the procedure being performed  Patient identity confirmed: verbally with patient and provided demographic " data    Procedure Details - Lesion Destruction:     Number of Lesions:  1  Lesion 1:     Body area:  Lower extremity    Lower extremity location:  L big toe    Malignancy: benign lesion      Destruction method: chemical removal

## 2024-09-23 ENCOUNTER — HOSPITAL ENCOUNTER (EMERGENCY)
Facility: HOSPITAL | Age: 29
Discharge: HOME | End: 2024-09-23
Attending: EMERGENCY MEDICINE
Payer: COMMERCIAL

## 2024-09-23 VITALS
HEART RATE: 80 BPM | SYSTOLIC BLOOD PRESSURE: 126 MMHG | RESPIRATION RATE: 18 BRPM | TEMPERATURE: 98.4 F | DIASTOLIC BLOOD PRESSURE: 81 MMHG | OXYGEN SATURATION: 100 %

## 2024-09-23 DIAGNOSIS — M54.12 CERVICAL RADICULOPATHY: Primary | ICD-10-CM

## 2024-09-23 PROCEDURE — 99283 EMERGENCY DEPT VISIT LOW MDM: CPT

## 2024-09-23 PROCEDURE — 99284 EMERGENCY DEPT VISIT MOD MDM: CPT | Performed by: EMERGENCY MEDICINE

## 2024-09-23 RX ORDER — METHOCARBAMOL 500 MG/1
500 TABLET, FILM COATED ORAL ONCE
Status: COMPLETED | OUTPATIENT
Start: 2024-09-23 | End: 2024-09-23

## 2024-09-23 RX ORDER — IBUPROFEN 600 MG/1
600 TABLET, FILM COATED ORAL ONCE
Status: COMPLETED | OUTPATIENT
Start: 2024-09-23 | End: 2024-09-23

## 2024-09-23 RX ORDER — LIDOCAINE 50 MG/G
1 PATCH TOPICAL ONCE
Status: DISCONTINUED | OUTPATIENT
Start: 2024-09-23 | End: 2024-09-23 | Stop reason: HOSPADM

## 2024-09-23 RX ORDER — METHOCARBAMOL 500 MG/1
500 TABLET, FILM COATED ORAL 2 TIMES DAILY
Qty: 20 TABLET | Refills: 0 | Status: SHIPPED | OUTPATIENT
Start: 2024-09-23

## 2024-09-23 RX ORDER — NAPROXEN 500 MG/1
500 TABLET ORAL 2 TIMES DAILY WITH MEALS
Qty: 30 TABLET | Refills: 0 | Status: SHIPPED | OUTPATIENT
Start: 2024-09-23 | End: 2024-09-27 | Stop reason: SDUPTHER

## 2024-09-23 RX ADMIN — IBUPROFEN 600 MG: 600 TABLET, FILM COATED ORAL at 17:42

## 2024-09-23 RX ADMIN — METHOCARBAMOL 500 MG: 500 TABLET ORAL at 17:42

## 2024-09-23 RX ADMIN — LIDOCAINE 1 PATCH: 700 PATCH TOPICAL at 17:41

## 2024-09-23 NOTE — ED PROVIDER NOTES
1. Cervical radiculopathy      ED Disposition       ED Disposition   Discharge    Condition   Stable    Date/Time   Mon Sep 23, 2024  5:42 PM    Comment   Bee Marquez discharge to home/self care.                   Assessment & Plan       Medical Decision Making  Female patient who presented to the emergency department for neck pain and headaches for the past month.  Upon examination at bedside, patient was noted to have midline cervical tenderness to palpation as well as a positive Spurling test suggesting possible cervical radiculopathy.  Given no other symptoms including fever, visual changes, or altered mental status, no clinical evidence to suggest meningitis.  Patient's symptoms improved while in the emergency department after being given Motrin, Robaxin, and lidocaine patch also suggesting possible musculoskeletal pain.  Plan is for patient to be discharged with referral to comprehensive spine program as well as prescription for Robaxin and naproxen.  Patient was advised to return to the emergency department if symptoms worsen.  Patient was agreeable to plan.    Risk  Prescription drug management.                     Medications   ibuprofen (MOTRIN) tablet 600 mg (600 mg Oral Given 9/23/24 1742)   methocarbamol (ROBAXIN) tablet 500 mg (500 mg Oral Given 9/23/24 1742)       History of Present Illness       29-year-old female who presents to the emergency department for neck pain has been ongoing for the past month.  Patient states that the pain is located between her shoulders which is causing her to have headaches as well as pain down in her bilateral arms.  Patient states that at work she sometimes has to lift heavy heavy boxes which may have caused the pain.  Patient describes the pain as a tightness which then radiates down her back into her arms.  Patient denies any visual changes.  Otherwise no other acute complaints.  Patient states she has been taking Tylenol with minimal to no  improvement in her symptoms at home. Denies chest pain, SOB, cough, abdominal pain, n/v/d, fever, chills, dizziness, lightheadedness, dysuria, hematuria, hematochezia, or melena.               Review of Systems   Constitutional:  Negative for appetite change, chills, diaphoresis, fatigue and fever.   HENT:  Negative for congestion, ear pain, postnasal drip, rhinorrhea, sore throat and trouble swallowing.    Eyes:  Negative for pain and visual disturbance.   Respiratory:  Negative for cough and shortness of breath.    Cardiovascular:  Negative for chest pain and palpitations.   Gastrointestinal:  Negative for abdominal pain, constipation, diarrhea, nausea and vomiting.   Genitourinary:  Negative for decreased urine volume, dysuria and hematuria.   Musculoskeletal:  Positive for neck pain. Negative for arthralgias and back pain.   Skin:  Negative for color change and rash.   Neurological:  Positive for headaches. Negative for dizziness, seizures, syncope, weakness and light-headedness.   All other systems reviewed and are negative.          Objective     ED Triage Vitals   Temperature Pulse Blood Pressure Respirations SpO2 Patient Position - Orthostatic VS   09/23/24 1524 09/23/24 1523 09/23/24 1524 09/23/24 1524 09/23/24 1523 09/23/24 1524   98.4 °F (36.9 °C) 80 126/81 18 100 % Sitting      Temp Source Heart Rate Source BP Location FiO2 (%) Pain Score    09/23/24 1524 09/23/24 1524 09/23/24 1524 -- --    Oral Monitor Right arm          Physical Exam  Vitals and nursing note reviewed.   Constitutional:       General: She is not in acute distress.     Appearance: Normal appearance. She is normal weight.   HENT:      Head: Normocephalic and atraumatic.      Right Ear: External ear normal.      Left Ear: External ear normal.      Nose: Nose normal.      Mouth/Throat:      Pharynx: Oropharynx is clear.   Eyes:      Conjunctiva/sclera: Conjunctivae normal.   Neck:      Comments: Tenderness to palpation of the midline  cervical spine along with a positive Spurling test.  Cardiovascular:      Rate and Rhythm: Normal rate and regular rhythm.      Pulses: Normal pulses.      Heart sounds: Normal heart sounds.      Comments: RRR with +S1 and S2, no murmurs appreciated on exam. Peripheral pulses intact.    Pulmonary:      Effort: Pulmonary effort is normal. No respiratory distress.      Breath sounds: Normal breath sounds. No wheezing, rhonchi or rales.      Comments: CTABL with no abnormal lung sounds such as wheezes or rales appreciated on exam.     Abdominal:      General: Abdomen is flat. Bowel sounds are normal. There is no distension.      Palpations: Abdomen is soft.      Tenderness: There is no abdominal tenderness.      Comments: Soft, non tender, normo-active bowel sounds. Without rigidity, guarding, or distension.     Musculoskeletal:         General: Normal range of motion.      Cervical back: Normal range of motion. Tenderness present.   Skin:     General: Skin is warm and dry.   Neurological:      General: No focal deficit present.      Mental Status: She is alert and oriented to person, place, and time. Mental status is at baseline.      Comments: CN grossly intact on visualization. No focal neurologic deficits noted on exam.  5/5 strength in all extremities. Neurovascularly intact with normal sensation and motor function.             Labs Reviewed - No data to display  No orders to display       Procedures    ED Medication and Procedure Management   Prior to Admission Medications   Prescriptions Last Dose Informant Patient Reported? Taking?   Prenatal Vit-Fe Fumarate-FA (Prenatal Vitamin) 27-0.8 MG TABS  Self No No   Sig: Take 1 tablet by mouth in the morning   ergocalciferol (VITAMIN D2) 50,000 units  Self No No   Sig: Take 1 capsule (50,000 Units total) by mouth once a week   meloxicam (Mobic) 15 mg tablet  Self No No   Sig: Take 1 tablet (15 mg total) by mouth daily   Patient not taking: Reported on 9/6/2024    multivitamin (THERAGRAN) TABS  Self Yes No   Sig: Take 1 tablet by mouth daily   Patient not taking: Reported on 8/17/2023   tiZANidine (ZANAFLEX) 2 mg tablet  Self No No   Sig: TAKE 1 TABLET (2 MG TOTAL) BY MOUTH DAILY AT BEDTIME AS NEEDED FOR MUSCLE SPASMS   Patient not taking: Reported on 9/6/2024      Facility-Administered Medications: None     Discharge Medication List as of 9/23/2024  5:46 PM        START taking these medications    Details   methocarbamol (ROBAXIN) 500 mg tablet Take 1 tablet (500 mg total) by mouth 2 (two) times a day, Starting Mon 9/23/2024, Normal      naproxen (Naprosyn) 500 mg tablet Take 1 tablet (500 mg total) by mouth 2 (two) times a day with meals, Starting Mon 9/23/2024, Normal           CONTINUE these medications which have NOT CHANGED    Details   ergocalciferol (VITAMIN D2) 50,000 units Take 1 capsule (50,000 Units total) by mouth once a week, Starting Mon 7/22/2024, Normal      meloxicam (Mobic) 15 mg tablet Take 1 tablet (15 mg total) by mouth daily, Starting Wed 7/17/2024, Normal      multivitamin (THERAGRAN) TABS Take 1 tablet by mouth daily, Historical Med      Prenatal Vit-Fe Fumarate-FA (Prenatal Vitamin) 27-0.8 MG TABS Take 1 tablet by mouth in the morning, Starting Thu 11/16/2023, Until Thu 11/14/2024, Normal      tiZANidine (ZANAFLEX) 2 mg tablet TAKE 1 TABLET (2 MG TOTAL) BY MOUTH DAILY AT BEDTIME AS NEEDED FOR MUSCLE SPASMS, Starting Thu 8/29/2024, Normal                Julio Ponce MD  09/23/24 2040

## 2024-09-23 NOTE — ED ATTENDING ATTESTATION
"9/23/2024  I, Mike Puri DO, saw and evaluated the patient. I have discussed the patient with the resident/non-physician practitioner and agree with the resident's/non-physician practitioner's findings, Plan of Care, and MDM as documented in the resident's/non-physician practitioner's note, except where noted. All available labs and Radiology studies were reviewed.  I was present for key portions of any procedure(s) performed by the resident/non-physician practitioner and I was immediately available to provide assistance.       At this point I agree with the current assessment done in the Emergency Department.  I have conducted an independent evaluation of this patient a history and physical is as follows:    Patient is a 29-year-old female with no significant past medical history who presents with bilateral upper back pain.  Patient states that she has been having back pain for about 1 month but it was worse today.  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  She states that lifting exacerbates her pain.  She has taken Tylenol intermittently with some relief but does not \"like to take medication\".  She states that pain radiates into her bilateral upper extremities. She denies chest pain or shortness of breath.  She denies any traumatic injury.    On exam, patient is in no acute distress.  Heart is regular rate and rhythm.  Breath sounds normal.  Patient points to bilateral trapezius as area of maximal pain.  She does have mild tenderness to palpation in bilateral trapezius but the severity of pain is not reproducible with palpation.  He does have pain with abduction and elevation of bilateral shoulders.  Motor, sensation normal.    Do not suspect acute osseous injury.  Presentation consistent with muscle strain.  She is neurovascularly intact.  She is appropriate for discharge and outpatient follow-up.  Symptomatic treatment at home.  Return to ED if symptoms " "worsen or she develops numbness, tingling, weakness in the extremities or other concerns.    Portions of the above record have been created with voice recognition software.  Occasional wrong word or \"sound alike\" substitutions may have occurred due to the inherent limitations of voice recognition software.  Read the chart carefully and recognize, using context, where substitutions may have occurred.      ED Course         Critical Care Time  Procedures      "

## 2024-09-23 NOTE — Clinical Note
Bee Marquez was seen and treated in our emergency department on 9/23/2024.    No restrictions            Diagnosis:     Bee  may return to work on return date.    She may return on this date: 09/26/2024         If you have any questions or concerns, please don't hesitate to call.      Julio Ponce MD    ______________________________           _______________          _______________  Hospital Representative                              Date                                Time

## 2024-09-24 ENCOUNTER — TELEPHONE (OUTPATIENT)
Dept: ADMINISTRATIVE | Facility: OTHER | Age: 29
End: 2024-09-24

## 2024-09-24 NOTE — LETTER
VALUE BASED VIR  [address] [facility]  [facility] [place] [address]    Date: 09/24/24    Bee Marquez  [address] [address]    Dear Bee:                                                                                                                                Thank you for choosing [facility] emergency department for care.  Your primary care provider wants to make sure that your ongoing medical care is being addressed. If you require follow up care as a result of your emergency department visit, there are a few things the practice would like you to know.                As part of the network's continuing commitment to caring for our patients, we have added more same day appointments and have extended office hours to meet your medical needs. After hours, on-call physicians are available via your primary care provider's main office line.               We encourage you to contact our office prior to seeking treatment to discuss your symptoms with the medical staff.  Together, we can determine the correct course of action.  A majority of non-emergent conditions such as: common cold, flu-like symptoms, fevers, strains/sprains, dislocations, minor burns, cuts and animal bites can be treated at [facility] facilities. Diagnostic testing is available at some sites.               Of course, if you are experiencing a life threatening medical emergency call 911 or proceed directly to the nearest emergency room.    Your nearest [facility] facility is conveniently located at:    [address] [address]  [phone]  SKIP THE WAIT  Conveniently offered at most [facility] locations  [place] your spot online at [email or web address] or on the [facility] [place]    Sincerely,    VALUE BASED VIR  No information on file.

## 2024-09-24 NOTE — TELEPHONE ENCOUNTER
09/24/24 11:15 AM    Patient contacted post ED visit, outreach attempt made but message could not be left. Additional outreach attempt will be made.     Thank you.  Hermelinda Hopson MA  [facility] VALUE BASED VIR

## 2024-09-25 ENCOUNTER — TELEPHONE (OUTPATIENT)
Dept: PHYSICAL THERAPY | Facility: OTHER | Age: 29
End: 2024-09-25

## 2024-09-25 ENCOUNTER — HOSPITAL ENCOUNTER (OUTPATIENT)
Dept: RADIOLOGY | Facility: HOSPITAL | Age: 29
Discharge: HOME | End: 2024-09-25
Payer: COMMERCIAL

## 2024-09-25 DIAGNOSIS — M62.838 TRAPEZIUS MUSCLE SPASM: ICD-10-CM

## 2024-09-25 PROCEDURE — 72050 X-RAY EXAM NECK SPINE 4/5VWS: CPT

## 2024-09-25 PROCEDURE — 72072 X-RAY EXAM THORAC SPINE 3VWS: CPT

## 2024-09-25 NOTE — TELEPHONE ENCOUNTER
"Call placed to the patient per [facility] referral.    \"The subscriber you have dialed is not in service\".    This is the 1st attempt to reach he patient. Will defer referral per protocol.  "

## 2024-09-27 ENCOUNTER — OFFICE VISIT (OUTPATIENT)
Dept: FAMILY MEDICINE CLINIC | Facility: CLINIC | Age: 29
End: 2024-09-27
Payer: COMMERCIAL

## 2024-09-27 VITALS
OXYGEN SATURATION: 97 % | TEMPERATURE: 98 F | RESPIRATION RATE: 16 BRPM | BODY MASS INDEX: 27.83 KG/M2 | WEIGHT: 163 LBS | DIASTOLIC BLOOD PRESSURE: 62 MMHG | HEART RATE: 97 BPM | SYSTOLIC BLOOD PRESSURE: 110 MMHG | HEIGHT: 64 IN

## 2024-09-27 DIAGNOSIS — M54.12 CERVICAL RADICULOPATHY: ICD-10-CM

## 2024-09-27 DIAGNOSIS — M62.830 SPASM OF BOTH TRAPEZIUS MUSCLES: ICD-10-CM

## 2024-09-27 DIAGNOSIS — M54.2 NECK PAIN: Primary | ICD-10-CM

## 2024-09-27 DIAGNOSIS — M62.838 TRAPEZIUS MUSCLE SPASM: ICD-10-CM

## 2024-09-27 PROCEDURE — 99213 OFFICE O/P EST LOW 20 MIN: CPT | Performed by: FAMILY MEDICINE

## 2024-09-27 RX ORDER — TIZANIDINE 2 MG/1
2 TABLET ORAL
Qty: 30 TABLET | Refills: 1 | Status: SHIPPED | OUTPATIENT
Start: 2024-09-27

## 2024-09-27 RX ORDER — NAPROXEN 500 MG/1
500 TABLET ORAL 2 TIMES DAILY WITH MEALS
Qty: 30 TABLET | Refills: 1 | Status: SHIPPED | OUTPATIENT
Start: 2024-09-27

## 2024-09-27 NOTE — ASSESSMENT & PLAN NOTE
Recent xrays  c spine  and thoracic spine neg  will send for PT    Orders:    Ambulatory Referral to Physical Therapy; Future

## 2024-09-27 NOTE — PROGRESS NOTES
"Ambulatory Visit  Name: Bee Marquez      : 1995      MRN: [id number]  Encounter Provider: Pauline Grigsby MD  Encounter Date: 2024   Encounter department: [facility] MEDICINE    Assessment & Plan  Neck pain  Recent xrays  c spine  and thoracic spine neg  will send for PT    Orders:    Ambulatory Referral to Physical Therapy; Future    Spasm of both trapezius muscles  Pt will need PT cont  naprosyn  and muscle relaxant  note for work return   needs 10 minute break every 2 hours  for 2 months          Cervical radiculopathy    Orders:    naproxen (Naprosyn) 500 mg tablet; Take 1 tablet (500 mg total) by mouth 2 (two) times a day with meals    Trapezius muscle spasm    Orders:    tiZANidine (ZANAFLEX) 2 mg tablet; Take 1 tablet (2 mg total) by mouth daily at bedtime as needed for muscle spasms       History of Present Illness     Pt here for ER follow up fpr neck pain she ahs had for several months xrays c spine and thoracic spine negastivne          Review of Systems   Musculoskeletal:  Positive for neck pain (chronic).   Neurological:  Positive for numbness.           Objective     /62 (BP Location: Left arm, Patient Position: Sitting, Cuff Size: Standard)   Pulse 97   Temp 98 °F (36.7 °C) (Tympanic)   Resp 16   Ht 5' 4\" (1.626 m)   Wt 73.9 kg (163 lb)   SpO2 97%   BMI 27.98 kg/m²     Physical Exam  Constitutional:       General: She is not in acute distress.     Appearance: Normal appearance.   Musculoskeletal:         General: Tenderness (paracervical muscle tenderness/bilateral trapezius  FROM  some pain with rotation exrtension and flexion) present.   Neurological:      Mental Status: She is alert.       "

## 2024-09-27 NOTE — ASSESSMENT & PLAN NOTE
Pt will need PT cont  naprosyn  and muscle relaxant  note for work return 9/29  needs 10 minute break every 2 hours  for 2 months

## 2024-09-30 ENCOUNTER — OFFICE VISIT (OUTPATIENT)
Dept: PODIATRY | Facility: CLINIC | Age: 29
End: 2024-09-30
Payer: COMMERCIAL

## 2024-09-30 VITALS
OXYGEN SATURATION: 100 % | WEIGHT: 163 LBS | HEIGHT: 64 IN | BODY MASS INDEX: 27.83 KG/M2 | SYSTOLIC BLOOD PRESSURE: 114 MMHG | DIASTOLIC BLOOD PRESSURE: 76 MMHG | HEART RATE: 69 BPM

## 2024-09-30 DIAGNOSIS — B07.0 VERRUCA PLANTARIS: Primary | ICD-10-CM

## 2024-09-30 PROCEDURE — 17110 DESTRUCTION B9 LES UP TO 14: CPT | Performed by: PODIATRIST

## 2024-09-30 PROCEDURE — RECHECK: Performed by: PODIATRIST

## 2024-09-30 NOTE — PROGRESS NOTES
Assessment/Plan:     The patient's clinical examination today significant for a punctate callosity to the distal/plantar aspect of the left great toe with minimal tenderness to palpation and lateral squeeze today.  There is interruption of skin lines with small punctate hematomas consistent with verruca formation.  Pedal pulses are palpable.     The verrucous lesion was debrided of callus tissue utilizing sterile #15 blade without complication.  The lesion was then chemically ablated with application of Cantharone.  And dry sterile dressing was applied and is to maintain for the next 48 hours.     Recommend follow-up in 3 to 4 weeks.        Diagnoses and all orders for this visit:    Verruca plantaris          Subjective:     Patient ID: Bee Marquez is a 29 y.o. female.    The patient presents today for follow-up of a verrucous lesion to the plantar aspect of her left great toe.  She tolerated Cantharone therapy well after her last visit.  She notes no pain or discomfort today.   was utilized during the duration of the visit via iPad.      PAST MEDICAL HISTORY:  Past Medical History:   Diagnosis Date    Patient desires pregnancy 11/16/2023       PAST SURGICAL HISTORY:  History reviewed. No pertinent surgical history.     ALLERGIES:  Patient has no known allergies.    MEDICATIONS:  Current Outpatient Medications   Medication Sig Dispense Refill    ergocalciferol (VITAMIN D2) 50,000 units Take 1 capsule (50,000 Units total) by mouth once a week 12 capsule 1    methocarbamol (ROBAXIN) 500 mg tablet Take 1 tablet (500 mg total) by mouth 2 (two) times a day 20 tablet 0    naproxen (Naprosyn) 500 mg tablet Take 1 tablet (500 mg total) by mouth 2 (two) times a day with meals 30 tablet 1    Prenatal Vit-Fe Fumarate-FA (Prenatal Vitamin) 27-0.8 MG TABS Take 1 tablet by mouth in the morning 90 tablet 3    tiZANidine (ZANAFLEX) 2 mg tablet Take 1 tablet (2 mg total) by mouth daily at  bedtime as needed for muscle spasms 30 tablet 1     No current facility-administered medications for this visit.       SOCIAL HISTORY:  Social History     Socioeconomic History    Marital status: /Civil Union     Spouse name: None    Number of children: None    Years of education: None    Highest education level: None   Occupational History    None   Tobacco Use    Smoking status: Never    Smokeless tobacco: Never   Vaping Use    Vaping status: Never Used   Substance and Sexual Activity    Alcohol use: Never    Drug use: Never    Sexual activity: Yes     Partners: Male   Other Topics Concern    None   Social History Narrative    None     Social Determinants of Health     Financial Resource Strain: Low Risk  (8/17/2023)    Overall Financial Resource Strain (CARDIA)     Difficulty of Paying Living Expenses: Not hard at all   Food Insecurity: No Food Insecurity (8/17/2023)    Hunger Vital Sign     Worried About Running Out of Food in the Last Year: Never true     Ran Out of Food in the Last Year: Never true   Transportation Needs: No Transportation Needs (8/17/2023)    PRAPARE - Transportation     Lack of Transportation (Medical): No     Lack of Transportation (Non-Medical): No   Physical Activity: Not on file   Stress: Not on file   Social Connections: Not on file   Intimate Partner Violence: Not At Risk (8/28/2023)    Humiliation, Afraid, Rape, and Kick questionnaire     Fear of Current or Ex-Partner: No     Emotionally Abused: No     Physically Abused: No     Sexually Abused: No   Housing Stability: Low Risk  (8/17/2023)    Housing Stability Vital Sign     Unable to Pay for Housing in the Last Year: No     Number of Times Moved in the Last Year: 1     Homeless in the Last Year: No        Review of Systems   Constitutional: Negative.    HENT: Negative.     Eyes: Negative.    Respiratory: Negative.     Cardiovascular: Negative.    Endocrine: Negative.    Musculoskeletal: Negative.    Neurological: Negative.   "  Hematological: Negative.    Psychiatric/Behavioral: Negative.           Objective:     Physical Exam  Constitutional:       Appearance: Normal appearance.   HENT:      Head: Normocephalic and atraumatic.      Nose: Nose normal.   Pulmonary:      Effort: Pulmonary effort is normal.   Skin:     General: Skin is warm.      Capillary Refill: Capillary refill takes less than 2 seconds.   Neurological:      General: No focal deficit present.      Mental Status: She is alert and oriented to person, place, and time.   Psychiatric:         Mood and Affect: Mood normal.         Behavior: Behavior normal.         Thought Content: Thought content normal.         Lesion Destruction    Date/Time: 9/30/2024 10:45 AM    Performed by: Chidi Antonio DPM  Authorized by: Chidi Antonio DPM  [place] Protocol:  procedure performed by consultantConsent: Verbal consent obtained.  Risks and benefits: risks, benefits and alternatives were discussed  Consent given by: patient  Time out: Immediately prior to procedure a \"time out\" was called to verify the correct patient, procedure, equipment, support staff and site/side marked as required.  Timeout called at: 9/30/2024 10:45 AM.  Patient understanding: patient states understanding of the procedure being performed  Patient identity confirmed: provided demographic data    Procedure Details - Lesion Destruction:     Number of Lesions:  1  Lesion 1:     Body area:  Lower extremity    Lower extremity location:  L big toe    Malignancy: benign lesion      Destruction method: chemical removal        "

## 2024-10-01 ENCOUNTER — TELEPHONE (OUTPATIENT)
Age: 29
End: 2024-10-01

## 2024-10-01 NOTE — TELEPHONE ENCOUNTER
"I did a basic note (Just stating the fact that she was seen here on 9/27/24)  There were no other details such as needing to be excused for previous days or an actual return date.  I sent her a message on \"My Chart\" stating that the letter can be found there or she may  a copy at our office.  "

## 2024-10-01 NOTE — TELEPHONE ENCOUNTER
Pt called [facility] [employer] with [employer]  requesting a return to work note from Dr. Grigsby after her visit on 9/27.     Please contact pt to advise once letter ready for ; pt is deaf and will need translation.

## 2024-10-02 NOTE — TELEPHONE ENCOUNTER
Pt called [facility] with [employer]  seeing if the letter was written for her employer. I advised her it was ready for pickup and provided the address for [facility].

## 2024-10-04 ENCOUNTER — TELEPHONE (OUTPATIENT)
Age: 29
End: 2024-10-04

## 2024-10-04 NOTE — TELEPHONE ENCOUNTER
Caller: Bee Marquez    Doctor and/or Office: Dr. Antonio/[facility]    CB#: [phone] ()    Escalation: Care Patient is in a lot of pain and has some discoloration on her foot after last weeks office visit. Is this normal? Please return call to  [phone]. I also used . Thank you   Spoke with pt she stated when she figures out her availability she will call back to schedule appointment

## 2025-04-14 ENCOUNTER — APPOINTMENT (EMERGENCY)
Dept: RADIOLOGY | Facility: HOSPITAL | Age: 30
End: 2025-04-14
Payer: OTHER MISCELLANEOUS

## 2025-04-14 ENCOUNTER — HOSPITAL ENCOUNTER (EMERGENCY)
Facility: HOSPITAL | Age: 30
Discharge: HOME | End: 2025-04-14
Attending: EMERGENCY MEDICINE
Payer: OTHER MISCELLANEOUS

## 2025-04-14 VITALS
OXYGEN SATURATION: 99 % | TEMPERATURE: 98.2 F | DIASTOLIC BLOOD PRESSURE: 61 MMHG | HEART RATE: 93 BPM | SYSTOLIC BLOOD PRESSURE: 121 MMHG | RESPIRATION RATE: 16 BRPM
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
## 2025-04-14 DIAGNOSIS — T14.8XXA SUPERFICIAL LACERATION: ICD-10-CM

## 2025-04-14 DIAGNOSIS — S69.91XA INJURY OF FINGER OF RIGHT HAND, INITIAL ENCOUNTER: Primary | ICD-10-CM

## 2025-04-14 PROCEDURE — 90715 TDAP VACCINE 7 YRS/> IM: CPT | Performed by: EMERGENCY MEDICINE

## 2025-04-14 PROCEDURE — 99283 EMERGENCY DEPT VISIT LOW MDM: CPT

## 2025-04-14 PROCEDURE — 99284 EMERGENCY DEPT VISIT MOD MDM: CPT | Performed by: EMERGENCY MEDICINE

## 2025-04-14 PROCEDURE — 90471 IMMUNIZATION ADMIN: CPT

## 2025-04-14 PROCEDURE — 73140 X-RAY EXAM OF FINGER(S): CPT

## 2025-04-14 RX ADMIN — TETANUS TOXOID, REDUCED DIPHTHERIA TOXOID AND ACELLULAR PERTUSSIS VACCINE, ADSORBED 0.5 ML: 5; 2.5; 8; 8; 2.5 SUSPENSION INTRAMUSCULAR at 23:01

## 2025-04-14 NOTE — Clinical Note
Bee Marquez was seen and treated in our emergency department on 4/14/2025.                Diagnosis:     Bee  .    She may return on this date:          If you have any questions or concerns, please don't hesitate to call.      Francis Flynn MD    ______________________________           _______________          _______________  Hospital Representative                              Date                                Time

## 2025-04-14 NOTE — Clinical Note
Bee Marquez was seen and treated in our emergency department on 4/14/2025.                Diagnosis:     Bee  may return to work on return date.    She may return on this date: 04/16/2025         If you have any questions or concerns, please don't hesitate to call.      Brenda Oliveira RN    ______________________________           _______________          _______________  Hospital Representative                              Date                                Time

## 2025-04-15 ENCOUNTER — APPOINTMENT (OUTPATIENT)
Dept: URGENT CARE | Facility: CLINIC | Age: 30
End: 2025-04-15
Payer: OTHER MISCELLANEOUS

## 2025-04-15 PROCEDURE — 99213 OFFICE O/P EST LOW 20 MIN: CPT | Performed by: NURSE PRACTITIONER

## 2025-04-15 NOTE — ED PROVIDER NOTES
Time reflects when diagnosis was documented in both MDM as applicable and the Disposition within this note       Time User Action Codes Description Comment    4/14/2025 11:30 PM Francis Flynn [S69.91XA] Injury of finger of right hand, initial encounter     4/14/2025 11:30 PM Francis Flynn Add [T14.8XXA] Superficial laceration     4/15/2025  2:58 AM Francis Flynn [W45.0XXA] Injury by nail, initial encounter           ED Disposition       ED Disposition   Discharge    Condition   Stable    Date/Time   Mon Apr 14, 2025 11:30 PM    Comment   Bee Marquez discharge to home/self care.                   Assessment & Plan       Medical Decision Making  Nail injury: No subungual hematoma.  Nail currently well adhered but I discussed risk of nail loss  Finger injury : NO fracture    Amount and/or Complexity of Data Reviewed  Radiology: ordered and independent interpretation performed.    Risk  Prescription drug management.        ED Course as of 04/15/25 0258   Mon Apr 14, 2025   8707 Differential includes fracture versus contusion.  No subungual hematoma to suggest benefit from trephination or nail removal for nailbed repair, which would likely worsen trauma to nail rather than improve it.       Medications   tetanus-diphtheria-acellular pertussis (BOOSTRIX) IM injection 0.5 mL (0.5 mL Intramuscular Given 4/14/25 2301)       ED Risk Strat Scores                    No data recorded        SBIRT 20yo+      Flowsheet Row Most Recent Value   Initial Alcohol Screen: US AUDIT-C     1. How often do you have a drink containing alcohol? 0 Filed at: 04/14/2025 2255   2. How many drinks containing alcohol do you have on a typical day you are drinking?  0 Filed at: 04/14/2025 2255   3b. FEMALE Any Age, or MALE 65+: How often do you have 4 or more drinks on one occassion? 0 Filed at: 04/14/2025 2255   Audit-C Score 0 Filed at: 04/14/2025 2255   JOSH: How many times in the past year have you...    Used an  illegal drug or used a prescription medication for non-medical reasons? Never Filed at: 04/14/2025 6545                            History of Present Illness       Chief Complaint   Patient presents with    Finger Injury     Pt arrives with injury to right index finger that happened at 9pm. Pt states she was at work when machinery at work jammed her finger. Reports she only sprayed disinfectant spray on it.       Past Medical History:   Diagnosis Date    Patient desires pregnancy 11/16/2023      History reviewed. No pertinent surgical history.   Family History   Problem Relation Age of Onset    No Known Problems Mother     No Known Problems Father     Deafness Brother     No Known Problems Brother     No Known Problems Maternal Grandmother     Heart disease Maternal Grandfather     No Known Problems Paternal Grandmother     Heart disease Paternal Grandfather     Breast cancer Neg Hx     Colon cancer Neg Hx     Ovarian cancer Neg Hx       Social History     Tobacco Use    Smoking status: Never    Smokeless tobacco: Never   Vaping Use    Vaping status: Never Used   Substance Use Topics    Alcohol use: Never    Drug use: Never      E-Cigarette/Vaping    E-Cigarette Use Never User       E-Cigarette/Vaping Substances    Nicotine No     THC No     CBD No     Flavoring No     Other No     Unknown No       I have reviewed and agree with the history as documented.     The patient reports just prior to arrival that her index finger on her dominant (right) hand was crushed by some machinery.  No  other injuries.  No numbness.  She cannot recall her last tetanus vaccine.          Review of Systems   All other systems reviewed and are negative.          Objective       ED Triage Vitals [04/14/25 2234]   Temperature Pulse Blood Pressure Respirations SpO2 Patient Position - Orthostatic VS   98.2 °F (36.8 °C) 93 121/61 16 99 % Sitting      Temp Source Heart Rate Source BP Location FiO2 (%) Pain Score    Oral Monitor Left arm -- --       Vitals      Date and Time Temp Pulse SpO2 Resp BP Pain Score FACES Pain Rating User   04/14/25 2234 98.2 °F (36.8 °C) 93 99 % 16 121/61 -- -- KLB            Physical Exam  Vitals and nursing note reviewed.   Constitutional:       Appearance: She is normal weight.   HENT:      Head: Normocephalic and atraumatic.      Nose: Nose normal.      Mouth/Throat:      Mouth: Mucous membranes are moist.   Eyes:      Conjunctiva/sclera: Conjunctivae normal.   Pulmonary:      Effort: Pulmonary effort is normal.   Musculoskeletal:         General: Swelling and tenderness (distal finger) present. Normal range of motion.      Cervical back: Normal range of motion.   Skin:     General: Skin is warm and dry.      Comments: 3mm laceration on dorsal right index finger.  2mm laceration through nail.   Neurological:      General: No focal deficit present.      Mental Status: She is alert.   Psychiatric:         Mood and Affect: Mood normal.         Results Reviewed       None            XR finger second digit-index RIGHT   ED Interpretation by Francis Flynn MD (04/14 2316)   No fracture, normal alignment          Procedures    ED Medication and Procedure Management   Prior to Admission Medications   Prescriptions Last Dose Informant Patient Reported? Taking?   Prenatal Vit-Fe Fumarate-FA (Prenatal Vitamin) 27-0.8 MG TABS  Self No No   Sig: Take 1 tablet by mouth in the morning   ergocalciferol (VITAMIN D2) 50,000 units  Self No No   Sig: Take 1 capsule (50,000 Units total) by mouth once a week   methocarbamol (ROBAXIN) 500 mg tablet  Self No No   Sig: Take 1 tablet (500 mg total) by mouth 2 (two) times a day   naproxen (Naprosyn) 500 mg tablet  Self No No   Sig: Take 1 tablet (500 mg total) by mouth 2 (two) times a day with meals   tiZANidine (ZANAFLEX) 2 mg tablet  Self No No   Sig: Take 1 tablet (2 mg total) by mouth daily at bedtime as needed for muscle spasms      Facility-Administered Medications: None     Discharge  Medication List as of 4/14/2025 11:30 PM        CONTINUE these medications which have NOT CHANGED    Details   ergocalciferol (VITAMIN D2) 50,000 units Take 1 capsule (50,000 Units total) by mouth once a week, Starting Mon 7/22/2024, Normal      methocarbamol (ROBAXIN) 500 mg tablet Take 1 tablet (500 mg total) by mouth 2 (two) times a day, Starting Mon 9/23/2024, Normal      naproxen (Naprosyn) 500 mg tablet Take 1 tablet (500 mg total) by mouth 2 (two) times a day with meals, Starting Fri 9/27/2024, Normal      Prenatal Vit-Fe Fumarate-FA (Prenatal Vitamin) 27-0.8 MG TABS Take 1 tablet by mouth in the morning, Starting Thu 11/16/2023, Until Thu 11/14/2024, Normal      tiZANidine (ZANAFLEX) 2 mg tablet Take 1 tablet (2 mg total) by mouth daily at bedtime as needed for muscle spasms, Starting Fri 9/27/2024, Normal           No discharge procedures on file.  ED SEPSIS DOCUMENTATION   Time reflects when diagnosis was documented in both MDM as applicable and the Disposition within this note       Time User Action Codes Description Comment    4/14/2025 11:30 PM Francis Flynn [S69.91XA] Injury of finger of right hand, initial encounter     4/14/2025 11:30 PM Francis Flynn [T14.8XXA] Superficial laceration     4/15/2025  2:58 AM Francis Flynn [W45.0XXA] Injury by nail, initial encounter                  Francis Flynn MD  04/15/25 0258

## 2025-04-18 ENCOUNTER — APPOINTMENT (OUTPATIENT)
Dept: URGENT CARE | Facility: CLINIC | Age: 30
End: 2025-04-18
Payer: OTHER MISCELLANEOUS

## 2025-04-18 PROCEDURE — 99213 OFFICE O/P EST LOW 20 MIN: CPT | Performed by: NURSE PRACTITIONER

## 2025-04-23 ENCOUNTER — APPOINTMENT (OUTPATIENT)
Dept: URGENT CARE | Facility: CLINIC | Age: 30
End: 2025-04-23
Payer: OTHER MISCELLANEOUS

## 2025-04-23 PROCEDURE — 99213 OFFICE O/P EST LOW 20 MIN: CPT | Performed by: FAMILY MEDICINE
